# Patient Record
Sex: FEMALE | Race: BLACK OR AFRICAN AMERICAN | NOT HISPANIC OR LATINO | Employment: FULL TIME | URBAN - METROPOLITAN AREA
[De-identification: names, ages, dates, MRNs, and addresses within clinical notes are randomized per-mention and may not be internally consistent; named-entity substitution may affect disease eponyms.]

---

## 2017-07-25 ENCOUNTER — HOSPITAL ENCOUNTER (OUTPATIENT)
Dept: RADIOLOGY | Facility: HOSPITAL | Age: 52
Discharge: HOME | End: 2017-07-25
Attending: FAMILY MEDICINE
Payer: COMMERCIAL

## 2017-07-25 ENCOUNTER — OFFICE VISIT (OUTPATIENT)
Dept: FAMILY MEDICINE | Facility: CLINIC | Age: 52
End: 2017-07-25
Payer: COMMERCIAL

## 2017-07-25 VITALS
HEART RATE: 113 BPM | HEIGHT: 62 IN | OXYGEN SATURATION: 99 % | TEMPERATURE: 98 F | DIASTOLIC BLOOD PRESSURE: 78 MMHG | SYSTOLIC BLOOD PRESSURE: 124 MMHG | BODY MASS INDEX: 37.64 KG/M2 | WEIGHT: 204.56 LBS

## 2017-07-25 DIAGNOSIS — M54.41 CHRONIC BILATERAL LOW BACK PAIN WITH RIGHT-SIDED SCIATICA: ICD-10-CM

## 2017-07-25 DIAGNOSIS — M25.551 RIGHT HIP PAIN: Primary | ICD-10-CM

## 2017-07-25 DIAGNOSIS — G89.29 CHRONIC BILATERAL LOW BACK PAIN WITH RIGHT-SIDED SCIATICA: ICD-10-CM

## 2017-07-25 DIAGNOSIS — M25.551 RIGHT HIP PAIN: ICD-10-CM

## 2017-07-25 PROCEDURE — 73502 X-RAY EXAM HIP UNI 2-3 VIEWS: CPT | Mod: 26,RT,, | Performed by: RADIOLOGY

## 2017-07-25 PROCEDURE — 72100 X-RAY EXAM L-S SPINE 2/3 VWS: CPT | Mod: 26,,, | Performed by: RADIOLOGY

## 2017-07-25 PROCEDURE — 99203 OFFICE O/P NEW LOW 30 MIN: CPT | Mod: S$GLB,,, | Performed by: FAMILY MEDICINE

## 2017-07-25 PROCEDURE — 73502 X-RAY EXAM HIP UNI 2-3 VIEWS: CPT | Mod: TC,PO,RT

## 2017-07-25 PROCEDURE — 72100 X-RAY EXAM L-S SPINE 2/3 VWS: CPT | Mod: TC,PO

## 2017-07-25 PROCEDURE — 99999 PR PBB SHADOW E&M-EST. PATIENT-LVL III: CPT | Mod: PBBFAC,,, | Performed by: FAMILY MEDICINE

## 2017-07-25 RX ORDER — METHOCARBAMOL 500 MG/1
750 TABLET, FILM COATED ORAL
COMMUNITY
Start: 2017-07-23 | End: 2017-07-28

## 2017-07-25 RX ORDER — MELOXICAM 7.5 MG/1
7.5 TABLET ORAL DAILY
Qty: 30 TABLET | Refills: 1 | Status: SHIPPED | OUTPATIENT
Start: 2017-07-25 | End: 2018-12-11

## 2017-07-25 NOTE — PROGRESS NOTES
Subjective:       Patient ID: Marcie Judge is a 51 y.o. female.    Chief Complaint: Establish Care; Hip Pain; and Back Pain      HPI Comments:     She is establishing care complaining of low back and right leg pain.  She slipped on some water in May and landed on her right buttock.  From that point forward she's been having low back pain and right hip pain.  She took Tylenol for it but it continued to get worse, finally going to our [facility] 2 days ago, where they x-rayed her hip only and told her it was not broken.  They gave her a Toradol shot which temporarily relieved her pain, and Robaxin which helped her sleep.  However she has not taken any more medication and is now back in severe pain.  She says it hurts to bend her right leg and has trouble with putting on shoes etc.    Fairly healthy patient with history of hysterectomy, cholecystectomy, hiatal hernia repair.  She is on no other medications.  She had low back pain about 15 years ago that was treated for the course of the year with physical therapy and finally improved.  She works as a  at [employer], and about once a day she has to bend over and lift something that's 50 pounds.      Review of Systems    Objective:      Physical Exam   Constitutional: She appears well-developed and well-nourished. She appears distressed.   Very uncomfortable with position change.   HENT:   Head: Normocephalic.   Cardiovascular: Normal rate, regular rhythm and normal heart sounds.    No murmur heard.  Pulses:       Popliteal pulses are 2+ on the right side, and 2+ on the left side.        Dorsalis pedis pulses are 2+ on the right side, and 2+ on the left side.        Posterior tibial pulses are 2+ on the right side, and 2+ on the left side.   Pulmonary/Chest: Effort normal and breath sounds normal. She has no wheezes.   Abdominal: Soft. There is no tenderness.   Musculoskeletal: She exhibits tenderness.   Pain elicited with forward flexion at the waist,  right-sided lateral bending, hip abduction, hip flexion.    Diffuse tenderness in low back both spinal and paraspinal with mild spasm.    Moderate hip joint tenderness on the right, moderate to severe tenderness over the greater trochanter on the right   Neurological: She is alert. She has normal strength. Gait abnormal. Coordination normal.   Reflex Scores:       Patellar reflexes are 2+ on the right side and 4+ on the left side.  Skin: Skin is warm and dry. She is not diaphoretic.   Psychiatric: She has a normal mood and affect. Her behavior is normal. Judgment and thought content normal.   Nursing note and vitals reviewed.      Assessment:       1. Right hip pain    2. Chronic bilateral low back pain with right-sided sciatica        Plan:   Right hip pain  Comments:  Maximum tenderness in the trochanteric area.  Will x-ray and start oral anti-inflammatories  Orders:  -     X-Ray Hip 2 View Right; Future; Expected date: 07/25/2017  -     Ambulatory consult to Pain Clinic    Chronic bilateral low back pain with right-sided sciatica  Comments:  X-ray today of our spine.  Referred to pain management.  Follow-up in a month or 2 for annual physical.  No bending or lifting, especially at work  Orders:  -     X-Ray Lumbar Spine AP And Lateral; Future; Expected date: 07/25/2017  -     Ambulatory consult to Pain Clinic    Other orders  -     meloxicam (MOBIC) 7.5 MG tablet; Take 1 tablet (7.5 mg total) by mouth once daily.  Dispense: 30 tablet; Refill: 1

## 2017-08-07 ENCOUNTER — OFFICE VISIT (OUTPATIENT)
Dept: PAIN MEDICINE | Facility: CLINIC | Age: 52
End: 2017-08-07
Payer: COMMERCIAL

## 2017-08-07 VITALS
DIASTOLIC BLOOD PRESSURE: 84 MMHG | HEIGHT: 62 IN | WEIGHT: 206 LBS | SYSTOLIC BLOOD PRESSURE: 126 MMHG | HEART RATE: 95 BPM | BODY MASS INDEX: 37.91 KG/M2

## 2017-08-07 DIAGNOSIS — M70.61 GREATER TROCHANTERIC BURSITIS, RIGHT: ICD-10-CM

## 2017-08-07 DIAGNOSIS — M47.817 SPONDYLOSIS OF LUMBOSACRAL REGION WITHOUT MYELOPATHY OR RADICULOPATHY: ICD-10-CM

## 2017-08-07 DIAGNOSIS — M46.1 SACROILIITIS: ICD-10-CM

## 2017-08-07 DIAGNOSIS — M54.16 RIGHT LUMBAR RADICULOPATHY: Primary | ICD-10-CM

## 2017-08-07 PROCEDURE — 99203 OFFICE O/P NEW LOW 30 MIN: CPT | Mod: S$GLB,,, | Performed by: ANESTHESIOLOGY

## 2017-08-07 PROCEDURE — 99999 PR PBB SHADOW E&M-EST. PATIENT-LVL III: CPT | Mod: PBBFAC,,, | Performed by: ANESTHESIOLOGY

## 2017-08-07 NOTE — PROGRESS NOTES
Chief Pain Complaint:  Low back pain, Rt hip pain    History of Present Illness:   This patient is a 51 y.o. female who presents today complaining of the above noted pain/s. The patient describes the pain as follows.    - duration of pain: pain for months   - timing: constant   - character: sharp, aching  - radiating, dermatomal: extends into right leg  - antecedent trauma, prior spinal surgery: patient reports prior trauma, no prior spinal surgery   - pertinent negatives: No fever, No chills, No weight loss, No bladder dysfunction, No bowel dysfunctionweakness right hip and leg No saddle anesthesia  - pertinent positives: right leg weakness    - medications, other therapies tried (physical therapy, injections):     >> mobic    >> Has NOT previously undergone Physical Therapy    >> Has NOT previously undergone spinal injection/s      Imaging / Labs / Studies (reviewed on 8/7/2017):      Results for orders placed during the hospital encounter of 07/25/17   X-Ray Lumbar Spine AP And Lateral    Narrative AP and lateral views of the lumbar spine  Findings: The vertebral bodies demonstrate normal height.  The alignment is within normal limits. There is minimal disc space narrowing noted throughout the lumbar spine.  No significant spondylosis.  Mild facet arthropathy noted within the lower lumbar spine.  There appears to be evidence for prior anterior abdominal wall hernia repair in the region of the right upper quadrant.         Review of Systems:  CONSTITUTIONAL: patient denies any fever, chills, or weight loss  SKIN: patient denies any rash or itching  RESPIRATORY: patient denies having any shortness of breath  GASTROINTESTINAL: patient denies having any diarrhea, constipation, or bowel incontinence  GENITOURINARY: patient denies having any abnormal bladder function    MUSCULOSKELETAL:  - patient complains of the above noted pain/s (see chief pain complaint)    NEUROLOGICAL:   - pain as above  - strength in Lower  "extremities is decreased, on the RIGHT  - sensation in Lower extremities is intact, right  - patient denies any loss of bowel or bladder control      PSYCHIATRIC: patient reports a history of depression    Other:  All other systems reviewed and are negative      Physical Exam:  /84   Pulse 95   Ht 5' 2" (1.575 m)   Wt 93.4 kg (206 lb)   BMI 37.68 kg/m²  (reviewed on 8/7/2017)  General: alert and oriented, in no apparent distress  Gait: normal gait  Skin: No rashes, No discoloration, No obvious lesions  HEENT: EOMI  Cardiovascular: no significant peripheral edema present  Respiratory: respirations nonlabored    Musculoskeletal:  - Any pain on flexion, extension, rotation:    >> pain on extension and rotation  - Straight Leg Raise:     >> LEFT :: negative    >> RIGHT :: negative    - Any tenderness to palpation across paraspinal muscles, joints, bursae:     >> across lumbar paraspinals, right SI, right GTB    Neuro:  - Extremity Strength:     >> LEFT :: 5/5    >> RIGHT :: 5/5  - Extremity Reflexes:    >> LEFT  :: 2+    >> RIGHT :: 2+     Psych:  Mood and affect is appropriate      Assessment:  Lumbar Radiculopathy  Sacroiliitis, right  Right intertrochanteric bursitis, right    Plan:  Patient presents today complaining of right-sided low back, right hip, and pain into the right thigh since she fell in May 2017.  X-ray imaging reviewed, I will send patient for a lumbar MRI and have her back to discuss findings.  She does appear to have some right SI and right GTB pain, we will see if she has anything suggestive of spinal nerve involvement.  Imaging / studies reviewed, detailed above.  I discussed in detail the risks, benefits, and alternatives to any and all potential treatment options.  All questions and concerns were fully addressed today in clinic.      Disclaimer:  This note may have been prepared using voice recognition software, it may have not been extensively proofed, as such there could be errors " within the text such as sound alike errors.

## 2017-08-09 ENCOUNTER — TELEPHONE (OUTPATIENT)
Dept: RADIOLOGY | Facility: HOSPITAL | Age: 52
End: 2017-08-09

## 2017-08-10 ENCOUNTER — HOSPITAL ENCOUNTER (OUTPATIENT)
Dept: RADIOLOGY | Facility: HOSPITAL | Age: 52
Discharge: HOME | End: 2017-08-10
Attending: ANESTHESIOLOGY
Payer: COMMERCIAL

## 2017-08-10 DIAGNOSIS — M54.16 RIGHT LUMBAR RADICULOPATHY: ICD-10-CM

## 2017-08-10 PROCEDURE — 72148 MRI LUMBAR SPINE W/O DYE: CPT | Mod: 26,,, | Performed by: RADIOLOGY

## 2017-08-10 PROCEDURE — 72148 MRI LUMBAR SPINE W/O DYE: CPT | Mod: TC,PO

## 2017-08-14 ENCOUNTER — TELEPHONE (OUTPATIENT)
Dept: PAIN MEDICINE | Facility: CLINIC | Age: 52
End: 2017-08-14

## 2017-08-14 NOTE — TELEPHONE ENCOUNTER
----- Message from Bob Andrews sent at 8/14/2017  8:04 AM CDT -----  Contact: [phone]  Pt would like to consult with nurse. Had to cancel appt asked to be rescheuled for a sooner date. Please call back at [phone]. x. lj

## 2017-08-15 ENCOUNTER — OFFICE VISIT (OUTPATIENT)
Dept: PAIN MEDICINE | Facility: CLINIC | Age: 52
End: 2017-08-15
Payer: COMMERCIAL

## 2017-08-15 VITALS
HEART RATE: 100 BPM | WEIGHT: 200 LBS | SYSTOLIC BLOOD PRESSURE: 131 MMHG | BODY MASS INDEX: 36.8 KG/M2 | DIASTOLIC BLOOD PRESSURE: 90 MMHG | HEIGHT: 62 IN

## 2017-08-15 DIAGNOSIS — M70.61 GREATER TROCHANTERIC BURSITIS, RIGHT: ICD-10-CM

## 2017-08-15 DIAGNOSIS — M51.36 DDD (DEGENERATIVE DISC DISEASE), LUMBAR: ICD-10-CM

## 2017-08-15 DIAGNOSIS — M54.16 LUMBAR RADICULOPATHY: Primary | ICD-10-CM

## 2017-08-15 DIAGNOSIS — M54.16 RIGHT LUMBAR RADICULOPATHY: Primary | ICD-10-CM

## 2017-08-15 DIAGNOSIS — M46.1 SACROILIITIS: ICD-10-CM

## 2017-08-15 DIAGNOSIS — M47.817 SPONDYLOSIS OF LUMBOSACRAL REGION WITHOUT MYELOPATHY OR RADICULOPATHY: ICD-10-CM

## 2017-08-15 PROCEDURE — 3008F BODY MASS INDEX DOCD: CPT | Mod: S$GLB,,, | Performed by: PHYSICIAN ASSISTANT

## 2017-08-15 PROCEDURE — 99999 PR PBB SHADOW E&M-EST. PATIENT-LVL III: CPT | Mod: PBBFAC,,, | Performed by: PHYSICIAN ASSISTANT

## 2017-08-15 PROCEDURE — 99214 OFFICE O/P EST MOD 30 MIN: CPT | Mod: S$GLB,,, | Performed by: PHYSICIAN ASSISTANT

## 2017-08-15 RX ORDER — GABAPENTIN 300 MG/1
300 CAPSULE ORAL NIGHTLY
Qty: 30 CAPSULE | Refills: 1 | Status: SHIPPED | OUTPATIENT
Start: 2017-08-15 | End: 2017-09-14 | Stop reason: SDUPTHER

## 2017-08-15 NOTE — PROGRESS NOTES
Chief Pain Complaint:  Low back pain, Rt hip pain    History of Present Illness:   This patient is a 51 y.o. female who presents today complaining of the above noted pain/s. The patient describes the pain as follows.    - duration of pain: pain for months   - timing: constant   - character: sharp, aching  - radiating, dermatomal: extends into right leg  - antecedent trauma, prior spinal surgery: patient reports prior trauma, no prior spinal surgery   - pertinent negatives: No fever, No chills, No weight loss, No bladder dysfunction, No bowel dysfunctionweakness right hip and leg No saddle anesthesia  - pertinent positives: right leg weakness    - medications, other therapies tried (physical therapy, injections):     >> Medications: mobic    >> Has NOT previously undergone Physical Therapy    >> Has NOT previously undergone spinal injection/s      Imaging / Labs / Studies (reviewed on 8/15/2017):    8/11/17 Lumbar MRI  Comparison: L. spine series 07/25/2017  Usual sequences performed without contrast.  History: Low back and RIGHT lower extremity pain  Findings:  Vertebral body height and disc spaces well maintained with good alignment.  No acute fracture or subluxation.  Small hemangiomas suggested within the L1, L3 and S1 vertebrae.  Marrow signal otherwise within normal limits.  No paraspinous mass or fluid collection.  Conus terminates at the L1 level.  Mild multilevel bilateral facet arthropathy.  T11-12 through L1-2: Minimal desiccation without significant loss of height.  No herniation or protrusion identified at these levels.  No central or foraminal stenosis.  Minimal facet arthropathy.  Evaluation on sagittal sequences only.  L2-3: Minimal disc desiccation.  Minimal posterior degenerative ridging in the foraminal and extraforaminal locations.  No disc herniation, protrusion or extrusion.  Minimal facet arthropathy and hypertrophied posterior ligaments.  No significant foraminal or central stenosis.  L3-4:  Minimal disc desiccation without herniation, protrusion or extrusion.  Mild facet arthropathy and hypertrophied posterior ligaments.  Trace fluid noted tracking within the facet joints.  No central stenosis.  Minimal inferior foraminal stenosis, slightly greater on the LEFT.  L4-5: Disc desiccation with mild uniform loss of height.  Posterior concentric disc bulge or ridging with flattening of the anterior margin of thecal sac.  Minimal foraminal and extraforaminal prominence disc bulge or ridging, greater on the RIGHT.  Cannot exclude focal protrusion or even potentially annular tear in this area. Inferior foraminal stenosis is mild with increased prominence on the RIGHT.  This comes in close proximity to exiting nerve root which appears minimally asymmetrically prominent.  Correlate for impingement.  Hypertrophy of posterior ligaments and facet joints.  No central stenosis.  L5-S1: Disc desiccation with equivocal uniform loss of height.  Posterior degenerative ridging without herniation, protrusion or extrusion.  Minimal effacement anterior margin thecal sac.  No central stenosis.  No significant foraminal stenosis.  Exiting RIGHT L5 nerve root comes in close proximity to the disc within the lateral margin of the foramen.  No definite effacement tip.  Correlate clinically.  Mild facet arthropathy and minimal hypertrophied posterior ligaments.         Results for orders placed during the hospital encounter of 07/25/17   X-Ray Lumbar Spine AP And Lateral    Narrative AP and lateral views of the lumbar spine  Findings: The vertebral bodies demonstrate normal height.  The alignment is within normal limits. There is minimal disc space narrowing noted throughout the lumbar spine.  No significant spondylosis.  Mild facet arthropathy noted within the lower lumbar spine.  There appears to be evidence for prior anterior abdominal wall hernia repair in the region of the right upper quadrant.         Review of  "Systems:  CONSTITUTIONAL: patient denies any fever, chills, or weight loss  SKIN: patient denies any rash or itching  RESPIRATORY: patient denies having any shortness of breath  GASTROINTESTINAL: patient denies having any diarrhea, constipation, or bowel incontinence  GENITOURINARY: patient denies having any abnormal bladder function    MUSCULOSKELETAL:  - patient complains of the above noted pain/s (see chief pain complaint)    NEUROLOGICAL:   - pain as above  - strength in Lower extremities is decreased, on the RIGHT  - sensation in Lower extremities is intact, right  - patient denies any loss of bowel or bladder control      PSYCHIATRIC: patient reports a history of depression    Other:  All other systems reviewed and are negative      Physical Exam:  Vitals:  BP (!) 131/90   Pulse 100   Ht 5' 2" (1.575 m)   Wt 90.7 kg (200 lb)   BMI 36.58 kg/m²    (reviewed on 8/15/2017)    General: alert and oriented, in no apparent distress  Gait: normal gait  Skin: No rashes, No discoloration, No obvious lesions  HEENT: EOMI  Cardiovascular: no significant peripheral edema present  Respiratory: respirations nonlabored    Musculoskeletal:  - Any pain on flexion, extension, rotation:    >> pain on extension and rotation  - Straight Leg Raise:     >> LEFT :: negative    >> RIGHT :: negative    - Any tenderness to palpation across paraspinal muscles, joints, bursae:     >> across lumbar paraspinals, right SI, right GTB    Neuro:  - Extremity Strength:     >> LEFT :: 5/5    >> RIGHT :: 5/5  - Extremity Reflexes:    >> LEFT  :: 2+    >> RIGHT :: 2+     Psych:  Mood and affect is appropriate      Assessment:  Lumbar Radiculopathy  Lumbar Degenerative Disc Disease   Sacroiliitis, right  Right intertrochanteric bursitis, right    Plan:  Patient presents today for follow-up. She complains of right-sided low back, right hip, and pain into the right thigh since she fell in May 2017.  She does appear to have some right SI and right " GTB pain, as well as lumbar radiculopathy.  - Lumbar MRI reviewed, detailed above, which shows multilevel degenerative disc and facet disease with most prominent findings at the L4-5 and L5-S1, which may affect right L4 and L5 nerve roots.  - Schedule right L4/5 + L5/S1 TF USHA.  - Consider physical therapy vs. SIJ + GT injection in the future.   - Will start gabapentin 300mg QHS. We can continue further increase if needed.   RTC after injection. I discussed the risks, benefits, and alternatives to potential treatment options. All questions and concerns were fully addressed today in clinic. Dr. Caraballo was consulted regarding the patient plan and agrees.           >> Pain Disability Index:  8/15/2017 :: 63    >>Opioid Risk Tool:  8/15/2017 :: 0

## 2017-08-23 ENCOUNTER — SURGERY (OUTPATIENT)
Age: 52
End: 2017-08-23

## 2017-08-23 ENCOUNTER — HOSPITAL ENCOUNTER (OUTPATIENT)
Facility: HOSPITAL | Age: 52
Discharge: HOME | End: 2017-08-23
Attending: ANESTHESIOLOGY | Admitting: ANESTHESIOLOGY
Payer: COMMERCIAL

## 2017-08-23 ENCOUNTER — HOSPITAL ENCOUNTER (OUTPATIENT)
Dept: RADIOLOGY | Facility: HOSPITAL | Age: 52
Discharge: HOME | End: 2017-08-23
Attending: PHYSICIAN ASSISTANT | Admitting: ANESTHESIOLOGY
Payer: COMMERCIAL

## 2017-08-23 VITALS
WEIGHT: 200 LBS | BODY MASS INDEX: 36.8 KG/M2 | DIASTOLIC BLOOD PRESSURE: 88 MMHG | HEIGHT: 62 IN | HEART RATE: 81 BPM | SYSTOLIC BLOOD PRESSURE: 142 MMHG | RESPIRATION RATE: 15 BRPM | OXYGEN SATURATION: 97 %

## 2017-08-23 DIAGNOSIS — M54.16 RIGHT LUMBAR RADICULOPATHY: ICD-10-CM

## 2017-08-23 DIAGNOSIS — M54.16 RIGHT LUMBAR RADICULOPATHY: Primary | ICD-10-CM

## 2017-08-23 PROCEDURE — 25500020 PHARM REV CODE 255

## 2017-08-23 PROCEDURE — 64484 NJX AA&/STRD TFRM EPI L/S EA: CPT

## 2017-08-23 PROCEDURE — 63600175 PHARM REV CODE 636 W HCPCS

## 2017-08-23 PROCEDURE — 64483 NJX AA&/STRD TFRM EPI L/S 1: CPT

## 2017-08-23 PROCEDURE — 64484 NJX AA&/STRD TFRM EPI L/S EA: CPT | Mod: RT,,, | Performed by: ANESTHESIOLOGY

## 2017-08-23 PROCEDURE — 25000003 PHARM REV CODE 250: Performed by: ANESTHESIOLOGY

## 2017-08-23 PROCEDURE — 99152 MOD SED SAME PHYS/QHP 5/>YRS: CPT | Mod: ,,, | Performed by: ANESTHESIOLOGY

## 2017-08-23 PROCEDURE — 25000003 PHARM REV CODE 250

## 2017-08-23 PROCEDURE — 63600175 PHARM REV CODE 636 W HCPCS: Performed by: ANESTHESIOLOGY

## 2017-08-23 PROCEDURE — 64483 NJX AA&/STRD TFRM EPI L/S 1: CPT | Mod: RT,,, | Performed by: ANESTHESIOLOGY

## 2017-08-23 RX ORDER — DIAZEPAM 5 MG/1
5 TABLET ORAL ONCE
Status: COMPLETED | OUTPATIENT
Start: 2017-08-23 | End: 2017-08-23

## 2017-08-23 RX ORDER — LIDOCAINE HYDROCHLORIDE 20 MG/ML
INJECTION, SOLUTION INFILTRATION; PERINEURAL
Status: DISCONTINUED | OUTPATIENT
Start: 2017-08-23 | End: 2017-08-23 | Stop reason: HOSPADM

## 2017-08-23 RX ORDER — DEXAMETHASONE SODIUM PHOSPHATE 4 MG/ML
INJECTION, SOLUTION INTRA-ARTICULAR; INTRALESIONAL; INTRAMUSCULAR; INTRAVENOUS; SOFT TISSUE
Status: DISCONTINUED | OUTPATIENT
Start: 2017-08-23 | End: 2017-08-23 | Stop reason: HOSPADM

## 2017-08-23 RX ADMIN — LIDOCAINE HYDROCHLORIDE 10 ML: 20 INJECTION, SOLUTION INFILTRATION; PERINEURAL at 12:08

## 2017-08-23 RX ADMIN — DIAZEPAM 5 MG: 5 TABLET ORAL at 11:08

## 2017-08-23 RX ADMIN — DEXAMETHASONE SODIUM PHOSPHATE 20 MG: 4 INJECTION, SOLUTION INTRA-ARTICULAR; INTRALESIONAL; INTRAMUSCULAR; INTRAVENOUS; SOFT TISSUE at 12:08

## 2017-08-23 NOTE — DISCHARGE SUMMARY
[facility]  Discharge Note       Description of Procedure: Lumbar Transforaminal Epidural Steroid Injection under Fluoroscopic Guidance    Procedure Date: 8/23/2017    Admit Date: 8/23/2017  Discharge Date: 8/23/2017     Attending Physician: Jamie Caraballo   Discharge Provider: Jamie Caraballo    Preoperative Diagnosis:   Active Hospital Problems    Diagnosis  POA    Right lumbar radiculopathy [M54.16]  Yes     Priority: High      Resolved Hospital Problems    Diagnosis Date Resolved POA   No resolved problems to display.        Postoperative Diagnosis: as above, same as preoperative diagnosis    Discharged Condition: Stable    Hospital Course: Patient was admitted for an outpatient procedure. The procedure was tolerated well with no complications.    Final Diagnoses: Same as principal problem.    Disposition: Home, self-care.    Follow up/Patient Instructions:  Follow-up in clinic in 2-3 weeks.    Medications: No medications were prescribed today. The patient was advised to resume normal medication regimen without change.  Specific information was provided regarding restarting any anticoagulant/s.    Discharge Procedure Orders (must include Diet, Follow-up, Activity):  Light activity for the remainder of the day, resume normal activity tomorrow. Resume normal diet. Follow-up in clinic in 2-3 weeks.

## 2017-08-23 NOTE — OP NOTE
"Procedure: Lumbar Transforaminal Epidural Steroid Injection under Fluoroscopic Guidance (supraneural approach)    Level: L4/5 and L5/S1    Side: Right    PROCEDURE DATE: 8/23/2017    Pre-operative Diagnosis: Lumbar Radiculopathy  Post-operative Diagnosis: Lumbar Radiculopathy    Provider: Jamie Caraballo MD  Assistant(s): None    Anesthesia: Local, Diazepam 5 mg po    >> 0 mg of VERSED    >> 0 mcg of FENTANYL     Indication: Low back pain with radiculopathy consistent with distribution of targeted nerve. Symptoms unresponsive to conservative treatments. Fluoroscopy was used to optimize visualization of needle placement and to maximize safety.     Procedure Description / Technique:  The patient was seen and identified in the preoperative area. Risks, benefits, complications, and alternatives were discussed with the patient. The patient agreed to proceed with the procedure and signed the consent. The site and side of the procedure was identified and marked. An IV was not placed for this procedure. The patient was taken to the procedural suite.    The patient was positioned in prone orientation on procedure table and a pillow was placed under the abdomen to reduce lumbar lordosis. A time out was performed prior to any intervention. The procedure, site, side, and allergies were stated and agreed to by all present. The lumbosacral area was widely prepped with ChloraPrep. The procedural site was draped in usual sterile fashion. Vital signs were closely monitored throughout this procedure. Conscious sedation was used for this procedure to decrease patient anxiety.    The target area was visualized under fluoroscopy. The cephalocaudal angle of the fluoroscope was adjusted as to align the vertebral end plates. The fluoroscopic arm was rotated ipsilaterally to an angle of approximately 30 degrees until the "zoran dog" outline came into view and the tip of the inferior superior articular process pointed towards the midline, " "6:00 position of the above pedicle. A 22 gauge 5 inch spinal needle was directed towards the "chin" of the "zoran dog" (adjacent to the pars interarticularis and inferior to the pedicle). The needle was advanced until OS was met at the inferior border of the pedicle / pars interface. The needle was adjusted so that it would pass inferior to the osseous border. The fluoroscope was then placed in the lateral position and the needle was slowly advanced until it rested in the posterior 1/3rd of the vertebral foramen. AP fluoroscopy was checked and the needle tip rested at the 6:00 position under the pedicle. No paresthesia was elicited during needle placement. With the needle tip in its final position, gentle aspiration was negative for blood and CSF. Omnipaque 240 (1 to 2 mL) was injected under live fluoroscopy. Microbore tubing was used for injection. There was no pain or paresthesia on injection. The contrast clearly delineated the targeted nerve root on AP fluoroscopy. No vascular uptake was seen. A solution containing 3 mL of 1% PF Lidocaine and 1.5 mL of Dexamethasone (10 mg/mL) was mixed and 2 mL was injected slowly at each level targeted. There was minimal resistance on injection. No pain or paresthesia was elicited on injection. The stylet was replaced and the needle was withdrawn intact. This procedure was performed for each of the above indicated levels.     Description of Findings: Not applicable    Prosthetic devices, grafts, tissues, or devices implanted: None    Specimen Removed: No    Estimated Blood Loss: minimal    COMPLICATIONS: None    DISPOSITION / PLANS: The patient was transferred to the recovery area in a stable condition for observation. The patient was reexamined prior to discharge. There was no evidence of acute neurologic injury following the procedure.  Patient was discharged from the recovery room after meeting discharge criteria. Home discharge instructions were given to the patient by " the staff.

## 2017-08-23 NOTE — PLAN OF CARE
Problem: Patient Care Overview  Goal: Plan of Care Review  Outcome: Outcome(s) achieved Date Met: 08/23/17  Patient discharged home in stable condition via wheelchair with ride. Verbalized understanding of discharge instructions. Patient voiced no complaints at this time. Patient stood at side of bed, walked steps with no new motor or sensory deficits. Neurologically intact.

## 2017-08-23 NOTE — INTERVAL H&P NOTE
The patient has been examined and the H&P has been reviewed:    I concur with the findings and no changes have occurred since H&P was written.    Anesthesia/Surgery risks, benefits and alternative options discussed and understood by patient/family.          Active Hospital Problems    Diagnosis  POA    Right lumbar radiculopathy [M54.16]  Yes     Priority: High      Resolved Hospital Problems    Diagnosis Date Resolved POA   No resolved problems to display.

## 2017-09-14 ENCOUNTER — OFFICE VISIT (OUTPATIENT)
Dept: PAIN MEDICINE | Facility: CLINIC | Age: 52
End: 2017-09-14
Payer: COMMERCIAL

## 2017-09-14 VITALS
SYSTOLIC BLOOD PRESSURE: 139 MMHG | HEART RATE: 101 BPM | WEIGHT: 200 LBS | HEIGHT: 62 IN | BODY MASS INDEX: 36.8 KG/M2 | DIASTOLIC BLOOD PRESSURE: 91 MMHG

## 2017-09-14 DIAGNOSIS — M46.1 SACROILIITIS: ICD-10-CM

## 2017-09-14 DIAGNOSIS — M70.61 GREATER TROCHANTERIC BURSITIS, RIGHT: ICD-10-CM

## 2017-09-14 DIAGNOSIS — M51.36 DDD (DEGENERATIVE DISC DISEASE), LUMBAR: ICD-10-CM

## 2017-09-14 DIAGNOSIS — M47.817 SPONDYLOSIS OF LUMBOSACRAL REGION WITHOUT MYELOPATHY OR RADICULOPATHY: ICD-10-CM

## 2017-09-14 DIAGNOSIS — M54.16 LUMBAR RADICULOPATHY: Primary | ICD-10-CM

## 2017-09-14 DIAGNOSIS — M54.16 RIGHT LUMBAR RADICULOPATHY: ICD-10-CM

## 2017-09-14 PROCEDURE — 99214 OFFICE O/P EST MOD 30 MIN: CPT | Mod: S$GLB,,, | Performed by: PHYSICIAN ASSISTANT

## 2017-09-14 PROCEDURE — 3008F BODY MASS INDEX DOCD: CPT | Mod: S$GLB,,, | Performed by: PHYSICIAN ASSISTANT

## 2017-09-14 PROCEDURE — 99999 PR PBB SHADOW E&M-EST. PATIENT-LVL III: CPT | Mod: PBBFAC,,, | Performed by: PHYSICIAN ASSISTANT

## 2017-09-14 RX ORDER — METHOCARBAMOL 500 MG/1
500 TABLET, FILM COATED ORAL 3 TIMES DAILY PRN
Qty: 90 TABLET | Refills: 1 | Status: SHIPPED | OUTPATIENT
Start: 2017-09-14 | End: 2018-12-11

## 2017-09-14 RX ORDER — GABAPENTIN 300 MG/1
600 CAPSULE ORAL NIGHTLY
Qty: 60 CAPSULE | Refills: 1 | Status: SHIPPED | OUTPATIENT
Start: 2017-09-14 | End: 2017-10-09

## 2017-09-14 NOTE — PROGRESS NOTES
Chief Pain Complaint:  Low back pain, Rt hip pain    History of Present Illness:   This patient is a 51 y.o. female who presents today complaining of the above noted pain/s. The patient describes the pain as follows.    - duration of pain: pain for months   - timing: constant   - character: sharp, aching  - radiating, dermatomal: extends into right leg  - antecedent trauma, prior spinal surgery: patient reports prior trauma, no prior spinal surgery   - pertinent negatives: No fever, No chills, No weight loss, No bladder dysfunction, No bowel dysfunctionweakness right hip and leg No saddle anesthesia  - pertinent positives: right leg weakness    - medications, other therapies tried (physical therapy, injections):     >> Medications: mobic    >> Has NOT previously undergone Physical Therapy    >> Has previously undergone spinal injection/s:    - right L4/5 +L5/S1 TF [facility] on 8-23-17      Imaging / Labs / Studies (reviewed on 9/14/2017):    8/11/17 Lumbar MRI  Comparison: L. spine series 07/25/2017  Usual sequences performed without contrast.  History: Low back and RIGHT lower extremity pain  Findings:  Vertebral body height and disc spaces well maintained with good alignment.  No acute fracture or subluxation.  Small hemangiomas suggested within the L1, L3 and S1 vertebrae.  Marrow signal otherwise within normal limits.  No paraspinous mass or fluid collection.  Conus terminates at the L1 level.  Mild multilevel bilateral facet arthropathy.  T11-12 through L1-2: Minimal desiccation without significant loss of height.  No herniation or protrusion identified at these levels.  No central or foraminal stenosis.  Minimal facet arthropathy.  Evaluation on sagittal sequences only.  L2-3: Minimal disc desiccation.  Minimal posterior degenerative ridging in the foraminal and extraforaminal locations.  No disc herniation, protrusion or extrusion.  Minimal facet arthropathy and hypertrophied posterior ligaments.  No significant  foraminal or central stenosis.  L3-4: Minimal disc desiccation without herniation, protrusion or extrusion.  Mild facet arthropathy and hypertrophied posterior ligaments.  Trace fluid noted tracking within the facet joints.  No central stenosis.  Minimal inferior foraminal stenosis, slightly greater on the LEFT.  L4-5: Disc desiccation with mild uniform loss of height.  Posterior concentric disc bulge or ridging with flattening of the anterior margin of thecal sac.  Minimal foraminal and extraforaminal prominence disc bulge or ridging, greater on the RIGHT.  Cannot exclude focal protrusion or even potentially annular tear in this area. Inferior foraminal stenosis is mild with increased prominence on the RIGHT.  This comes in close proximity to exiting nerve root which appears minimally asymmetrically prominent.  Correlate for impingement.  Hypertrophy of posterior ligaments and facet joints.  No central stenosis.  L5-S1: Disc desiccation with equivocal uniform loss of height.  Posterior degenerative ridging without herniation, protrusion or extrusion.  Minimal effacement anterior margin thecal sac.  No central stenosis.  No significant foraminal stenosis.  Exiting RIGHT L5 nerve root comes in close proximity to the disc within the lateral margin of the foramen.  No definite effacement tip.  Correlate clinically.  Mild facet arthropathy and minimal hypertrophied posterior ligaments.         Results for orders placed during the hospital encounter of 07/25/17   X-Ray Lumbar Spine AP And Lateral    Narrative AP and lateral views of the lumbar spine  Findings: The vertebral bodies demonstrate normal height.  The alignment is within normal limits. There is minimal disc space narrowing noted throughout the lumbar spine.  No significant spondylosis.  Mild facet arthropathy noted within the lower lumbar spine.  There appears to be evidence for prior anterior abdominal wall hernia repair in the region of the right upper  "quadrant.         Review of Systems:  CONSTITUTIONAL: patient denies any fever, chills, or weight loss  SKIN: patient denies any rash or itching  RESPIRATORY: patient denies having any shortness of breath  GASTROINTESTINAL: patient denies having any diarrhea, constipation, or bowel incontinence  GENITOURINARY: patient denies having any abnormal bladder function    MUSCULOSKELETAL:  - patient complains of the above noted pain/s (see chief pain complaint)    NEUROLOGICAL:   - pain as above  - strength in Lower extremities is decreased, on the RIGHT  - sensation in Lower extremities is intact, right  - patient denies any loss of bowel or bladder control      PSYCHIATRIC: patient reports a history of depression    Other:  All other systems reviewed and are negative      Physical Exam:  Vitals:  BP (!) 139/91   Pulse 101   Ht 5' 2" (1.575 m)   Wt 90.7 kg (200 lb)   BMI 36.58 kg/m²    (reviewed on 9/14/2017)    General: alert and oriented, in no apparent distress  Gait: normal gait  Skin: No rashes, No discoloration, No obvious lesions  HEENT: EOMI  Cardiovascular: no significant peripheral edema present  Respiratory: respirations nonlabored    Musculoskeletal:  - Any pain on flexion, extension, rotation:    >> pain on extension and rotation  - Straight Leg Raise:     >> LEFT :: negative    >> RIGHT :: negative  - Any tenderness to palpation across paraspinal muscles, joints, bursae:     >> across lumbar paraspinals    >> very tender to minimal palpation over bilateral SIJ + GTB, worse on the right    Neuro:  - Extremity Strength:     >> LEFT :: 5/5    >> RIGHT :: 5/5  - Extremity Reflexes:    >> LEFT  :: 2+    >> RIGHT :: 2+     Psych:  Mood and affect is appropriate        Assessment:  Lumbar Radiculopathy  Lumbar Degenerative Disc Disease   Sacroiliitis  Right intertrochanteric bursitis    Plan:  Patient presents today for follow-up. She complains of right-sided low back, right hip, and pain into the right thigh " since she fell in May 2017.  She does appear to have some right SI and right GTB pain, as well as lumbar radiculopathy.  Lumbar MRI shows multilevel degenerative disc and facet disease with most prominent findings at the L4-5 and L5-S1, which may affect right L4 and L5 nerve roots.  - S/p right L4/5 +L5/S1 TF [facility] on 8-23-17 with minimal relief.  - Start PT with passive modalities, including ice and heat, and active modalities, including a regimen for stretching and strengthening. Order sent to [facility] in [place] to include aquatherapy.   - Consider scheduling bilateral SIJ + GT if no relief from PT.   - Will increase gabapentin 300mg to 600mg QHS.   - Will give Rx of Robaxin 500mg TID PRN, as she requests something else for pain during the day.  - Order compound cream for potential topical pain relief. Pt will be contacted for [facility] regarding cost and payment.   - Continue Mobic 7.5mg PRN, which she feels helps somewhat with pain.  RTC after PT. I discussed the risks, benefits, and alternatives to potential treatment options. All questions and concerns were fully addressed today in clinic. Dr. Caraballo was consulted regarding the patient plan and agrees.           >> Pain Disability Index:  8/15/2017 :: 63    >>Opioid Risk Tool:  8/15/2017 :: 0

## 2017-10-09 ENCOUNTER — OFFICE VISIT (OUTPATIENT)
Dept: PAIN MEDICINE | Facility: CLINIC | Age: 52
End: 2017-10-09
Payer: COMMERCIAL

## 2017-10-09 VITALS
HEART RATE: 105 BPM | DIASTOLIC BLOOD PRESSURE: 83 MMHG | SYSTOLIC BLOOD PRESSURE: 125 MMHG | WEIGHT: 200 LBS | BODY MASS INDEX: 36.8 KG/M2 | HEIGHT: 62 IN

## 2017-10-09 DIAGNOSIS — M46.1 SACROILIITIS: Primary | ICD-10-CM

## 2017-10-09 DIAGNOSIS — M70.61 GREATER TROCHANTERIC BURSITIS OF RIGHT HIP: ICD-10-CM

## 2017-10-09 PROCEDURE — 99999 PR PBB SHADOW E&M-EST. PATIENT-LVL III: CPT | Mod: PBBFAC,,, | Performed by: ANESTHESIOLOGY

## 2017-10-09 PROCEDURE — 99214 OFFICE O/P EST MOD 30 MIN: CPT | Mod: S$GLB,,, | Performed by: ANESTHESIOLOGY

## 2017-10-09 RX ORDER — HYDROCODONE BITARTRATE AND ACETAMINOPHEN 7.5; 325 MG/1; MG/1
1 TABLET ORAL 2 TIMES DAILY PRN
Qty: 60 TABLET | Refills: 0 | Status: SHIPPED | OUTPATIENT
Start: 2017-10-09 | End: 2017-11-08

## 2017-10-09 NOTE — PROGRESS NOTES
Chief Pain Complaint:  Low back pain, Rt hip pain    History of Present Illness:   This patient is a 51 y.o. female who presents today complaining of the above noted pain/s. The patient describes the pain as follows.    - duration of pain: > 3 months   - timing: constant   - character: sharp, aching  - radiating, dermatomal: extends into right leg along L4/5 at times  - antecedent trauma, prior spinal surgery: patient reports prior trauma, no prior spinal surgery   - pertinent negatives: No fever, No chills, No weight loss, No bladder dysfunction, No bowel dysfunctionweakness right hip and leg No saddle anesthesia  - pertinent positives: right leg weakness    - medications, other therapies tried (physical therapy, injections):     >> Medications: mobic, robaxin, gabapentin    >> Has NOT previously undergone Physical Therapy    >> Has previously undergone spinal injection/s:    - right L4/5 +L5/S1 TF [facility] on 8-23-17        Imaging / Labs / Studies (reviewed on 10/9/2017):      8/11/17 Lumbar MRI  Comparison: L. spine series 07/25/2017  Usual sequences performed without contrast.  History: Low back and RIGHT lower extremity pain  Findings:  Vertebral body height and disc spaces well maintained with good alignment.  No acute fracture or subluxation.  Small hemangiomas suggested within the L1, L3 and S1 vertebrae.  Marrow signal otherwise within normal limits.  No paraspinous mass or fluid collection.  Conus terminates at the L1 level.  Mild multilevel bilateral facet arthropathy.  T11-12 through L1-2: Minimal desiccation without significant loss of height.  No herniation or protrusion identified at these levels.  No central or foraminal stenosis.  Minimal facet arthropathy.  Evaluation on sagittal sequences only.  L2-3: Minimal disc desiccation.  Minimal posterior degenerative ridging in the foraminal and extraforaminal locations.  No disc herniation, protrusion or extrusion.  Minimal facet arthropathy and hypertrophied  posterior ligaments.  No significant foraminal or central stenosis.  L3-4: Minimal disc desiccation without herniation, protrusion or extrusion.  Mild facet arthropathy and hypertrophied posterior ligaments.  Trace fluid noted tracking within the facet joints.  No central stenosis.  Minimal inferior foraminal stenosis, slightly greater on the LEFT.  L4-5: Disc desiccation with mild uniform loss of height.  Posterior concentric disc bulge or ridging with flattening of the anterior margin of thecal sac.  Minimal foraminal and extraforaminal prominence disc bulge or ridging, greater on the RIGHT.  Cannot exclude focal protrusion or even potentially annular tear in this area. Inferior foraminal stenosis is mild with increased prominence on the RIGHT.  This comes in close proximity to exiting nerve root which appears minimally asymmetrically prominent.  Correlate for impingement.  Hypertrophy of posterior ligaments and facet joints.  No central stenosis.  L5-S1: Disc desiccation with equivocal uniform loss of height.  Posterior degenerative ridging without herniation, protrusion or extrusion.  Minimal effacement anterior margin thecal sac.  No central stenosis.  No significant foraminal stenosis.  Exiting RIGHT L5 nerve root comes in close proximity to the disc within the lateral margin of the foramen.  No definite effacement tip.  Correlate clinically.  Mild facet arthropathy and minimal hypertrophied posterior ligaments.         Results for orders placed during the hospital encounter of 07/25/17   X-Ray Lumbar Spine AP And Lateral    Narrative AP and lateral views of the lumbar spine  Findings: The vertebral bodies demonstrate normal height.  The alignment is within normal limits. There is minimal disc space narrowing noted throughout the lumbar spine.  No significant spondylosis.  Mild facet arthropathy noted within the lower lumbar spine.  There appears to be evidence for prior anterior abdominal wall hernia repair  "in the region of the right upper quadrant.         Review of Systems:  CONSTITUTIONAL: patient denies any fever, chills, or weight loss  SKIN: patient denies any rash or itching  RESPIRATORY: patient denies having any shortness of breath  GASTROINTESTINAL: patient denies having any diarrhea, constipation, or bowel incontinence  GENITOURINARY: patient denies having any abnormal bladder function    MUSCULOSKELETAL:  - patient complains of the above noted pain/s (see chief pain complaint)    NEUROLOGICAL:   - pain as above  - strength in Lower extremities is decreased, on the RIGHT  - sensation in Lower extremities is intact, right  - patient denies any loss of bowel or bladder control      PSYCHIATRIC: patient reports a history of depression    Other:  All other systems reviewed and are negative      Physical Exam:  Vitals:  /83   Pulse 105   Ht 5' 2" (1.575 m)   Wt 90.7 kg (200 lb)   BMI 36.58 kg/m²    (reviewed on 10/9/2017)    General: alert and oriented, in no apparent distress  Gait: normal gait  Skin: No rashes, No discoloration, No obvious lesions  HEENT: EOMI  Cardiovascular: no significant peripheral edema present  Respiratory: respirations nonlabored    Musculoskeletal:  - Any pain on flexion, extension, rotation:    >> pain on extension and rotation  - Straight Leg Raise:     >> LEFT :: negative    >> RIGHT :: negative  - Any tenderness to palpation across paraspinal muscles, joints, bursae:     >> across lumbar paraspinals    >> very tender to minimal palpation over bilateral SIJ + GTB on the right  - (+) SARAH BETH on the right    Neuro:  - Extremity Strength:     >> LEFT :: 5/5    >> RIGHT :: 5/5  - Extremity Reflexes:    >> LEFT  :: 2+    >> RIGHT :: 2+     Psych:  Mood and affect is appropriate        Assessment:  Lumbar Radiculopathy  Lumbar Degenerative Disc Disease   Sacroiliitis  Right greater trochanteric bursitis    Plan:  - Patient presents today for follow-up. She complains of right-sided " low back, right hip, and pain into the right thigh since she fell in May 2017.  She does appear to have some right SI and right GTB pain, as well as lumbar radiculopathy.  Lumbar MRI shows multilevel degenerative disc and facet disease with most prominent findings at the L4-5 and L5-S1, which may affect right L4 and L5 nerve roots.  - S/p right L4/5 +L5/S1 TF [facility] on 8-23-17 with only days of relief.  - Stop gabapentin and robaxin.  - I will trial norco.  - Continue Mobic 7.5mg PRN, which she feels helps somewhat with pain.  - Schedule right SI + GTB injection.  - I discussed the risks, benefits, and alternatives to potential treatment options. All questions and concerns were fully addressed today in clinic.     >> Pain Disability Index:  8/15/2017 :: 63    >>Opioid Risk Tool:  8/15/2017 :: 0

## 2017-10-23 ENCOUNTER — TELEPHONE (OUTPATIENT)
Dept: PAIN MEDICINE | Facility: CLINIC | Age: 52
End: 2017-10-23

## 2017-10-23 NOTE — TELEPHONE ENCOUNTER
Patient contacted to confirm procedure.  Reminded of instructions listed below.     1. Procedure is scheduled for 11/1/17 at [facility] at [address], [place] [address] ([address]).  2. Nothing by mouth for 6 hours prior to your injection.  If you take blood pressure medication, please continue this, take this with sips of water.  3. If you are running a fever, have an active infection, or if you are taking antibiotics, the day of the procedure, please call and reschedule.  4. You must have someone accompany you to your procedure.  5. If you are on blood thinners (e.g. Coumadin, Plavix, Ticlid, Aggrenox, Xarelto, Aspirin), have you stopped them as previously advised?        Patient verbalized understanding.

## 2017-10-31 ENCOUNTER — TELEPHONE (OUTPATIENT)
Dept: PAIN MEDICINE | Facility: CLINIC | Age: 52
End: 2017-10-31

## 2017-10-31 DIAGNOSIS — M46.1 SACROILIITIS, NOT ELSEWHERE CLASSIFIED: Primary | ICD-10-CM

## 2017-10-31 DIAGNOSIS — M25.559 PAIN IN JOINT INVOLVING PELVIC REGION AND THIGH, UNSPECIFIED LATERALITY: ICD-10-CM

## 2017-10-31 NOTE — TELEPHONE ENCOUNTER
Left message for patient to confirm procedure.       1. Procedure is scheduled for 11/01/17 at [facility] at [address], [place] [address] ([address]).  2. Nothing by mouth for 6 hours prior to your injection.  If you take blood pressure medication, please continue this, take this with sips of water.  3. If you are running a fever, have an active infection, or if you are taking antibiotics, the day of the procedure, please call and reschedule.  4. You must have someone accompany you to your procedure.  5. If you are on blood thinners (e.g. Coumadin, Plavix, Ticlid, Aggrenox, Xarelto, Aspirin), have you stopped them as previously advised?

## 2017-11-22 ENCOUNTER — TELEPHONE (OUTPATIENT)
Dept: PAIN MEDICINE | Facility: CLINIC | Age: 52
End: 2017-11-22

## 2017-11-22 NOTE — TELEPHONE ENCOUNTER
----- Message from Sri Munoz sent at 11/22/2017  8:33 AM CST -----  Contact: Pt   Pt returning a phone call..[phone] (ojsa)

## 2018-12-11 ENCOUNTER — HOSPITAL ENCOUNTER (OUTPATIENT)
Dept: RADIOLOGY | Facility: HOSPITAL | Age: 53
Discharge: HOME | End: 2018-12-11
Attending: FAMILY MEDICINE
Payer: COMMERCIAL

## 2018-12-11 ENCOUNTER — OFFICE VISIT (OUTPATIENT)
Dept: FAMILY MEDICINE | Facility: CLINIC | Age: 53
End: 2018-12-11
Payer: COMMERCIAL

## 2018-12-11 VITALS
BODY MASS INDEX: 40.16 KG/M2 | SYSTOLIC BLOOD PRESSURE: 102 MMHG | DIASTOLIC BLOOD PRESSURE: 70 MMHG | OXYGEN SATURATION: 98 % | HEIGHT: 62 IN | WEIGHT: 218.25 LBS | HEART RATE: 108 BPM | TEMPERATURE: 98 F

## 2018-12-11 DIAGNOSIS — S79.911A HIP INJURY, RIGHT, INITIAL ENCOUNTER: ICD-10-CM

## 2018-12-11 DIAGNOSIS — Z00.00 WELL ADULT EXAM: Primary | ICD-10-CM

## 2018-12-11 DIAGNOSIS — J01.90 ACUTE BACTERIAL SINUSITIS: ICD-10-CM

## 2018-12-11 DIAGNOSIS — B96.89 ACUTE BACTERIAL SINUSITIS: ICD-10-CM

## 2018-12-11 DIAGNOSIS — Z12.11 COLON CANCER SCREENING: ICD-10-CM

## 2018-12-11 DIAGNOSIS — Z12.39 BREAST CANCER SCREENING: ICD-10-CM

## 2018-12-11 PROCEDURE — 99396 PREV VISIT EST AGE 40-64: CPT | Mod: 25,S$GLB,, | Performed by: FAMILY MEDICINE

## 2018-12-11 PROCEDURE — 90472 IMMUNIZATION ADMIN EACH ADD: CPT | Mod: S$GLB,,, | Performed by: FAMILY MEDICINE

## 2018-12-11 PROCEDURE — 90686 IIV4 VACC NO PRSV 0.5 ML IM: CPT | Mod: S$GLB,,, | Performed by: FAMILY MEDICINE

## 2018-12-11 PROCEDURE — 90715 TDAP VACCINE 7 YRS/> IM: CPT | Mod: S$GLB,,, | Performed by: FAMILY MEDICINE

## 2018-12-11 PROCEDURE — 99999 PR PBB SHADOW E&M-EST. PATIENT-LVL IV: CPT | Mod: PBBFAC,,, | Performed by: FAMILY MEDICINE

## 2018-12-11 PROCEDURE — 73502 X-RAY EXAM HIP UNI 2-3 VIEWS: CPT | Mod: 26,RT,, | Performed by: RADIOLOGY

## 2018-12-11 PROCEDURE — 73502 X-RAY EXAM HIP UNI 2-3 VIEWS: CPT | Mod: TC,FY,PO,RT

## 2018-12-11 PROCEDURE — 90471 IMMUNIZATION ADMIN: CPT | Mod: S$GLB,,, | Performed by: FAMILY MEDICINE

## 2018-12-11 RX ORDER — AMOXICILLIN AND CLAVULANATE POTASSIUM 875; 125 MG/1; MG/1
1 TABLET, FILM COATED ORAL EVERY 12 HOURS
Qty: 14 TABLET | Refills: 0 | Status: SHIPPED | OUTPATIENT
Start: 2018-12-11 | End: 2018-12-18

## 2018-12-11 NOTE — PROGRESS NOTES
Influenza vaccine administered via LD, TDAP vaccine administered via RD, see immunization record, instructed to wait for 15 min, tolerated well, no complaints.

## 2018-12-11 NOTE — PROGRESS NOTES
Chief Complaint:    Chief Complaint   Patient presents with    Hip Pain       History of Present Illness:  Patient presents today she is new to us she had been taking care of her .  She presents today complaining of sinus congestion sinus pain cough postnasal drip for the last several days denies any fever.  She is also complaining of pain around the right hip she failed and injured the hip.      ROS:  Review of Systems   Constitutional: Negative for activity change, chills, fatigue, fever and unexpected weight change.   HENT: Positive for congestion, sinus pressure and sinus pain. Negative for ear discharge, ear pain, hearing loss, postnasal drip and rhinorrhea.    Eyes: Negative for pain and visual disturbance.   Respiratory: Negative for cough, chest tightness and shortness of breath.    Cardiovascular: Negative for chest pain and palpitations.   Gastrointestinal: Negative for abdominal pain, diarrhea and vomiting.   Endocrine: Negative for heat intolerance.   Genitourinary: Negative for dysuria, flank pain, frequency and hematuria.   Musculoskeletal: Negative for back pain, gait problem and neck pain.   Skin: Negative for color change and rash.   Neurological: Negative for dizziness, tremors, seizures, numbness and headaches.   Psychiatric/Behavioral: Negative for agitation, hallucinations, self-injury, sleep disturbance and suicidal ideas. The patient is not nervous/anxious.        History reviewed. No pertinent past medical history.    Social History:  Social History     Socioeconomic History    Marital status:      Spouse name: None    Number of children: None    Years of education: None    Highest education level: None   Social Needs    Financial resource strain: None    Food insecurity - worry: None    Food insecurity - inability: None    Transportation needs - medical: None    Transportation needs - non-medical: None   Occupational History    None   Tobacco Use    Smoking  "status: Current Every Day Smoker     Packs/day: 1.50    Smokeless tobacco: Never Used   Substance and Sexual Activity    Alcohol use: No    Drug use: No     Comment: former    Sexual activity: None   Other Topics Concern    None   Social History Narrative    None       Family History:   family history is not on file.    Health Maintenance   Topic Date Due    Lipid Panel  1967    TETANUS VACCINE  10/27/1985    Pneumococcal Vaccine (Medium Risk) (1 of 1 - PPSV23) 10/27/1986    Mammogram  10/27/2007    Colonoscopy  10/27/2017    Influenza Vaccine  08/01/2018       Physical Exam:    Vital Signs  Temp: 98.2 °F (36.8 °C)  Temp src: Tympanic  Pulse: 108  SpO2: 98 %  BP: 102/70  BP Location: Left arm  Patient Position: Sitting  Pain Score:   7  Height and Weight  Height: 5' 2" (157.5 cm)  Weight: 99 kg (218 lb 4.1 oz)  BSA (Calculated - sq m): 2.08 sq meters  BMI (Calculated): 40  Weight in (lb) to have BMI = 25: 136.4]    Body mass index is 39.92 kg/m².    Physical Exam   Constitutional: She is oriented to person, place, and time. She appears well-developed.   HENT:   Head: Normocephalic.   Right Ear: External ear normal.   Left Ear: External ear normal.   Mouth/Throat: Oropharynx is clear and moist.   Postnasal drip present   Eyes: Conjunctivae are normal. Pupils are equal, round, and reactive to light.   Neck: Normal range of motion. Neck supple.   Cardiovascular: Normal rate, regular rhythm and normal heart sounds.   No murmur heard.  Pulmonary/Chest: Effort normal and breath sounds normal. No respiratory distress. She has no wheezes. She has no rales. She exhibits no tenderness.   Abdominal: Soft. She exhibits no distension and no mass. There is no tenderness. There is no guarding.   Musculoskeletal: She exhibits no edema or tenderness.        Legs:  Lymphadenopathy:     She has no cervical adenopathy.   Neurological: She is alert and oriented to person, place, and time. She has normal reflexes. "   Skin: Skin is warm and dry.   Psychiatric: She has a normal mood and affect. Her behavior is normal. Judgment and thought content normal.         Assessment:      ICD-10-CM ICD-9-CM   1. Well adult exam Z00.00 V70.0   2. Acute bacterial sinusitis J01.90 461.9    B96.89    3. Hip injury, right, initial encounter S79.911A 959.6   4. Colon cancer screening Z12.11 V76.51   5. Breast cancer screening Z12.31 V76.10         Plan:    Will treat her with Augmentin  X-ray the hip recommend meloxicam she has meloxicam at home that she can take.      Orders Placed This Encounter   Procedures    X-Ray Hips Bilateral 2 View Incl AP Pelvis    X-Ray Femur 2 View Right    Mammo Digital Screening Bilat    (In Office Administered) Tdap Vaccine    Influenza - Quadrivalent (3 years & older) (PF)    CBC auto differential    Comprehensive metabolic panel    Lipid panel    Hepatitis C antibody    Hemoglobin A1c       Current Outpatient Medications   Medication Sig Dispense Refill    amoxicillin-clavulanate 875-125mg (AUGMENTIN) 875-125 mg per tablet Take 1 tablet by mouth every 12 (twelve) hours. for 7 days 14 tablet 0     No current facility-administered medications for this visit.        Medications Discontinued During This Encounter   Medication Reason    meloxicam (MOBIC) 7.5 MG tablet Patient no longer taking    methocarbamol (ROBAXIN) 500 MG Tab Patient no longer taking       No Follow-up on file.      Tad Fang MD

## 2019-01-09 ENCOUNTER — LAB VISIT (OUTPATIENT)
Dept: LAB | Facility: HOSPITAL | Age: 54
End: 2019-01-09
Attending: FAMILY MEDICINE

## 2019-01-09 ENCOUNTER — OFFICE VISIT (OUTPATIENT)
Dept: FAMILY MEDICINE | Facility: CLINIC | Age: 54
End: 2019-01-09
Payer: COMMERCIAL

## 2019-01-09 VITALS
HEIGHT: 61 IN | HEART RATE: 98 BPM | OXYGEN SATURATION: 98 % | TEMPERATURE: 98 F | DIASTOLIC BLOOD PRESSURE: 84 MMHG | BODY MASS INDEX: 40.77 KG/M2 | WEIGHT: 215.94 LBS | SYSTOLIC BLOOD PRESSURE: 130 MMHG

## 2019-01-09 DIAGNOSIS — R60.0 PEDAL EDEMA: Primary | ICD-10-CM

## 2019-01-09 DIAGNOSIS — Z12.39 BREAST CANCER SCREENING: ICD-10-CM

## 2019-01-09 DIAGNOSIS — Z12.11 COLON CANCER SCREENING: ICD-10-CM

## 2019-01-09 DIAGNOSIS — R60.0 PEDAL EDEMA: ICD-10-CM

## 2019-01-09 LAB
AMORPH CRY UR QL COMP ASSIST: ABNORMAL
BILIRUB UR QL STRIP: NEGATIVE
CLARITY UR REFRACT.AUTO: ABNORMAL
COLOR UR AUTO: ABNORMAL
GLUCOSE UR QL STRIP: NEGATIVE
HGB UR QL STRIP: NEGATIVE
KETONES UR QL STRIP: NEGATIVE
LEUKOCYTE ESTERASE UR QL STRIP: NEGATIVE
MICROSCOPIC COMMENT: ABNORMAL
NITRITE UR QL STRIP: NEGATIVE
PH UR STRIP: 5 [PH] (ref 5–8)
PROT UR QL STRIP: NEGATIVE
SP GR UR STRIP: 1.02 (ref 1–1.03)
SQUAMOUS #/AREA URNS AUTO: 4 /HPF
URN SPEC COLLECT METH UR: ABNORMAL

## 2019-01-09 PROCEDURE — 83880 ASSAY OF NATRIURETIC PEPTIDE: CPT

## 2019-01-09 PROCEDURE — 36415 COLL VENOUS BLD VENIPUNCTURE: CPT | Mod: PO

## 2019-01-09 PROCEDURE — 3008F PR BODY MASS INDEX (BMI) DOCUMENTED: ICD-10-PCS | Mod: CPTII,S$GLB,, | Performed by: FAMILY MEDICINE

## 2019-01-09 PROCEDURE — 3008F BODY MASS INDEX DOCD: CPT | Mod: CPTII,S$GLB,, | Performed by: FAMILY MEDICINE

## 2019-01-09 PROCEDURE — 99999 PR PBB SHADOW E&M-EST. PATIENT-LVL III: CPT | Mod: PBBFAC,,, | Performed by: FAMILY MEDICINE

## 2019-01-09 PROCEDURE — 99213 OFFICE O/P EST LOW 20 MIN: CPT | Mod: S$GLB,,, | Performed by: FAMILY MEDICINE

## 2019-01-09 PROCEDURE — 99213 PR OFFICE/OUTPT VISIT, EST, LEVL III, 20-29 MIN: ICD-10-PCS | Mod: S$GLB,,, | Performed by: FAMILY MEDICINE

## 2019-01-09 PROCEDURE — 81001 URINALYSIS AUTO W/SCOPE: CPT

## 2019-01-09 PROCEDURE — 99999 PR PBB SHADOW E&M-EST. PATIENT-LVL III: ICD-10-PCS | Mod: PBBFAC,,, | Performed by: FAMILY MEDICINE

## 2019-01-09 NOTE — PROGRESS NOTES
Chief Complaint:    Chief Complaint   Patient presents with    Foot Swelling    Knee Pain       History of Present Illness:    Presents today complaining of mild swelling bilateral legs she denies any chest pain or shortness of breath does acknowledge this she is sitting for long periods of time.      ROS:  Review of Systems   Constitutional: Negative for activity change, chills, fatigue, fever and unexpected weight change.   HENT: Negative for congestion, ear discharge, ear pain, hearing loss, postnasal drip and rhinorrhea.    Eyes: Negative for pain and visual disturbance.   Respiratory: Negative for cough, chest tightness and shortness of breath.    Cardiovascular: Negative for chest pain and palpitations.   Gastrointestinal: Negative for abdominal pain, diarrhea and vomiting.   Endocrine: Negative for heat intolerance.   Genitourinary: Negative for dysuria, flank pain, frequency and hematuria.   Musculoskeletal: Negative for back pain, gait problem and neck pain.   Skin: Negative for color change and rash.   Neurological: Negative for dizziness, tremors, seizures, numbness and headaches.   Psychiatric/Behavioral: Negative for agitation, hallucinations, self-injury, sleep disturbance and suicidal ideas. The patient is not nervous/anxious.        History reviewed. No pertinent past medical history.    Social History:  Social History     Socioeconomic History    Marital status:      Spouse name: None    Number of children: None    Years of education: None    Highest education level: None   Social Needs    Financial resource strain: None    Food insecurity - worry: None    Food insecurity - inability: None    Transportation needs - medical: None    Transportation needs - non-medical: None   Occupational History    None   Tobacco Use    Smoking status: Current Every Day Smoker     Packs/day: 1.50    Smokeless tobacco: Never Used   Substance and Sexual Activity    Alcohol use: No    Drug use:  "No     Comment: former    Sexual activity: None   Other Topics Concern    None   Social History Narrative    None       Family History:   family history is not on file.    Health Maintenance   Topic Date Due    Pneumococcal Vaccine (Medium Risk) (1 of 1 - PPSV23) 10/27/1986    Mammogram  10/27/2007    Colonoscopy  10/27/2017    Lipid Panel  12/11/2023    TETANUS VACCINE  12/11/2028    Influenza Vaccine  Completed       Physical Exam:    Vital Signs  Temp: 97.7 °F (36.5 °C)  Temp src: Tympanic  Pulse: 98  SpO2: 98 %  BP: 130/84  BP Location: Left arm  Patient Position: Sitting  Pain Score:   8  Pain Loc: Knee  Height and Weight  Height: 5' 1" (154.9 cm)  Weight: 98 kg (215 lb 15.1 oz)  BSA (Calculated - sq m): 2.05 sq meters  BMI (Calculated): 40.9  Weight in (lb) to have BMI = 25: 132]    Body mass index is 40.8 kg/m².    Physical Exam   Constitutional: She is oriented to person, place, and time. She appears well-developed.   HENT:   Mouth/Throat: Oropharynx is clear and moist.   Eyes: Conjunctivae are normal. Pupils are equal, round, and reactive to light.   Neck: Normal range of motion. Neck supple.   Cardiovascular: Normal rate, regular rhythm and normal heart sounds.   No murmur heard.  Pulmonary/Chest: Effort normal and breath sounds normal. No respiratory distress. She has no wheezes. She has no rales. She exhibits no tenderness.   Abdominal: Soft. She exhibits no distension and no mass. There is no tenderness. There is no guarding.   Musculoskeletal: She exhibits no edema (Mild edema) or tenderness.   Lymphadenopathy:     She has no cervical adenopathy.   Neurological: She is alert and oriented to person, place, and time. She has normal reflexes.   Skin: Skin is warm and dry.   Psychiatric: She has a normal mood and affect. Her behavior is normal. Judgment and thought content normal.         Assessment:      ICD-10-CM ICD-9-CM   1. Pedal edema R60.0 782.3   2. Breast cancer screening Z12.31 V76.10 "   3. Colon cancer screening Z12.11 V76.51         Plan:    Pedal edema most likely dependent edema recommend elevation of the leg Check labs blood to rule out an organic cause.  Also recommend compression stockings.      Orders Placed This Encounter   Procedures    Mammo Digital Screening Bilat    URINALYSIS    Brain natriuretic peptide       No current outpatient medications on file.     No current facility-administered medications for this visit.        There are no discontinued medications.    No Follow-up on file.      Tad Fang MD

## 2019-01-10 LAB — BNP SERPL-MCNC: 16 PG/ML

## 2019-01-14 ENCOUNTER — TELEPHONE (OUTPATIENT)
Dept: FAMILY MEDICINE | Facility: CLINIC | Age: 54
End: 2019-01-14

## 2019-01-14 NOTE — TELEPHONE ENCOUNTER
----- Message from Kanika Rascon sent at 1/14/2019  9:47 AM CST -----  Contact: self [phone]  States that she is returning call. Please call back at [phone]//thank you acc

## 2019-01-16 ENCOUNTER — TELEPHONE (OUTPATIENT)
Dept: ENDOSCOPY | Facility: HOSPITAL | Age: 54
End: 2019-01-16

## 2019-03-08 ENCOUNTER — TELEPHONE (OUTPATIENT)
Dept: FAMILY MEDICINE | Facility: CLINIC | Age: 54
End: 2019-03-08

## 2019-03-08 NOTE — TELEPHONE ENCOUNTER
----- Message from Shamika Mays sent at 3/8/2019  1:35 PM CST -----  Contact: pt  The pt wants to know when was her last flu shot, no additional info given, can be reached at [phone]///[email or web address]

## 2019-05-17 NOTE — LETTER
August 7, 2017      Juanojse Ontiveros MD  [address] [facility] [place] [address]           [facility]  [address] [place] [address] [place] [address]  Phone: [phone]  Fax: [phone]          Patient: Marcie Judge   MR Number: [id number]   YOB: 1965   Date of Visit: 8/7/2017       Dear Dr. Juanjose Ontiveros:    Thank you for referring Marcie Judge to me for evaluation. Attached you will find relevant portions of my assessment and plan of care.    If you have questions, please do not hesitate to call me. I look forward to following Marcie Judge along with you.    Sincerely,    Jamie Caraballo MD    Enclosure  CC:  No Recipients    If you would like to receive this communication electronically, please contact [email or web address] or [phone] to request more information on [facility] access.    For providers and/or their staff who would like to refer a patient to [facility], please contact us through our one-stop-shop provider referral line, [facility], at [phone].    If you feel you have received this communication in error or would no longer like to receive these types of communications, please e-mail [email or web address]         
Imaging Studies/Medications

## 2019-08-22 ENCOUNTER — PATIENT OUTREACH (OUTPATIENT)
Dept: ADMINISTRATIVE | Facility: HOSPITAL | Age: 54
End: 2019-08-22

## 2019-10-25 DIAGNOSIS — Z12.11 COLON CANCER SCREENING: ICD-10-CM

## 2019-11-01 ENCOUNTER — PATIENT OUTREACH (OUTPATIENT)
Dept: ADMINISTRATIVE | Facility: HOSPITAL | Age: 54
End: 2019-11-01

## 2019-11-21 ENCOUNTER — PATIENT OUTREACH (OUTPATIENT)
Dept: ADMINISTRATIVE | Facility: HOSPITAL | Age: 54
End: 2019-11-21

## 2020-07-24 DIAGNOSIS — Z12.39 BREAST CANCER SCREENING: ICD-10-CM

## 2020-08-26 ENCOUNTER — PATIENT OUTREACH (OUTPATIENT)
Dept: ADMINISTRATIVE | Facility: HOSPITAL | Age: 55
End: 2020-08-26

## 2020-10-23 ENCOUNTER — PATIENT OUTREACH (OUTPATIENT)
Dept: ADMINISTRATIVE | Facility: HOSPITAL | Age: 55
End: 2020-10-23